# Patient Record
Sex: FEMALE | Race: BLACK OR AFRICAN AMERICAN | NOT HISPANIC OR LATINO | Employment: OTHER | URBAN - METROPOLITAN AREA
[De-identification: names, ages, dates, MRNs, and addresses within clinical notes are randomized per-mention and may not be internally consistent; named-entity substitution may affect disease eponyms.]

---

## 2024-03-11 ENCOUNTER — OFFICE VISIT (OUTPATIENT)
Dept: UROLOGY | Facility: CLINIC | Age: 69
End: 2024-03-11
Payer: MEDICARE

## 2024-03-11 VITALS
WEIGHT: 272.69 LBS | HEIGHT: 71 IN | BODY MASS INDEX: 38.18 KG/M2 | SYSTOLIC BLOOD PRESSURE: 99 MMHG | HEART RATE: 104 BPM | DIASTOLIC BLOOD PRESSURE: 68 MMHG

## 2024-03-11 DIAGNOSIS — Z85.53 PERSONAL HISTORY OF MALIGNANT NEOPLASM, RENAL PELVIS: Primary | ICD-10-CM

## 2024-03-11 DIAGNOSIS — R91.1 PULMONARY NODULE: ICD-10-CM

## 2024-03-11 DIAGNOSIS — C67.8 CANCER OF OVERLAPPING SITES OF BLADDER: ICD-10-CM

## 2024-03-11 DIAGNOSIS — N18.32 CHRONIC KIDNEY DISEASE (CKD) STAGE G3B/A1, MODERATELY DECREASED GLOMERULAR FILTRATION RATE (GFR) BETWEEN 30-44 ML/MIN/1.73 SQUARE METER AND ALBUMINURIA CREATININE RATIO LESS THAN 30 MG/G: ICD-10-CM

## 2024-03-11 DIAGNOSIS — Z85.54 HISTORY OF MALIGNANT NEOPLASM OF URETER: ICD-10-CM

## 2024-03-11 LAB
BILIRUB UR QL STRIP: NEGATIVE
GLUCOSE UR QL STRIP: NEGATIVE
KETONES UR QL STRIP: NEGATIVE
LEUKOCYTE ESTERASE UR QL STRIP: NEGATIVE
PH, POC UA: 5.5
POC BLOOD, URINE: NEGATIVE
POC NITRATES, URINE: NEGATIVE
POC RESIDUAL URINE VOLUME: 28 ML (ref 0–100)
PROT UR QL STRIP: NEGATIVE
SP GR UR STRIP: 1.02 (ref 1–1.03)
UROBILINOGEN UR STRIP-ACNC: 0.2 (ref 0.1–1.1)

## 2024-03-11 PROCEDURE — 99213 OFFICE O/P EST LOW 20 MIN: CPT | Mod: PBBFAC | Performed by: UROLOGY

## 2024-03-11 PROCEDURE — 99999 PR PBB SHADOW E&M-EST. PATIENT-LVL III: CPT | Mod: PBBFAC,,, | Performed by: UROLOGY

## 2024-03-11 PROCEDURE — 99999PBSHW POCT URINALYSIS, DIPSTICK, AUTOMATED, W/O SCOPE: Mod: PBBFAC,,,

## 2024-03-11 PROCEDURE — 99999PBSHW POCT BLADDER SCAN: Mod: PBBFAC,,,

## 2024-03-11 PROCEDURE — 81003 URINALYSIS AUTO W/O SCOPE: CPT | Mod: PBBFAC | Performed by: UROLOGY

## 2024-03-11 PROCEDURE — 51798 US URINE CAPACITY MEASURE: CPT | Mod: PBBFAC | Performed by: UROLOGY

## 2024-03-11 PROCEDURE — 99214 OFFICE O/P EST MOD 30 MIN: CPT | Mod: S$PBB,,, | Performed by: UROLOGY

## 2024-03-11 RX ORDER — COLCHICINE 0.6 MG/1
TABLET ORAL
COMMUNITY
Start: 2024-01-23 | End: 2024-05-30

## 2024-03-11 RX ORDER — BENZONATATE 200 MG/1
CAPSULE ORAL
COMMUNITY
Start: 2024-02-09 | End: 2024-05-30

## 2024-03-11 RX ORDER — DESLORATADINE 5 MG/1
5 TABLET, ORALLY DISINTEGRATING ORAL DAILY PRN
COMMUNITY

## 2024-03-11 RX ORDER — ALLOPURINOL 100 MG/1
100 TABLET ORAL
COMMUNITY
Start: 2024-01-23

## 2024-03-11 RX ORDER — CYCLOBENZAPRINE HCL 5 MG
5 TABLET ORAL NIGHTLY PRN
COMMUNITY
Start: 2024-02-14 | End: 2024-05-30

## 2024-03-11 RX ORDER — AMOXICILLIN AND CLAVULANATE POTASSIUM 875; 125 MG/1; MG/1
1 TABLET, FILM COATED ORAL 2 TIMES DAILY
COMMUNITY
Start: 2024-02-14 | End: 2024-05-30

## 2024-03-11 RX ORDER — SULFAMETHOXAZOLE AND TRIMETHOPRIM 800; 160 MG/1; MG/1
TABLET ORAL
COMMUNITY
Start: 2023-09-20 | End: 2024-05-30

## 2024-03-11 RX ORDER — TRIAMCINOLONE ACETONIDE 1 MG/G
OINTMENT TOPICAL
COMMUNITY
Start: 2023-12-03

## 2024-03-11 RX ORDER — LISINOPRIL 20 MG/1
20 TABLET ORAL DAILY
COMMUNITY

## 2024-03-11 RX ORDER — GABAPENTIN 300 MG/1
300 CAPSULE ORAL
COMMUNITY

## 2024-03-11 RX ORDER — DIAZEPAM 10 MG/1
10 TABLET ORAL DAILY PRN
COMMUNITY
Start: 2023-09-25 | End: 2024-05-30

## 2024-03-11 NOTE — PROGRESS NOTES
Subjective:       Patient ID: Sofya Padgett is a 68 y.o. female.    Chief Complaint: Establishment      History of Present Illness:     Ms Padgett has bladder cancer and underwent a normal surveillance cystoscopy on 11-29-23.  She is undergoing surveillance imaging through Dr Bedoya, her Oncologist.  Per Dr Bedoya, her CT scan from 1-10-24 looked good; it showed small lung nodules that decreased to 5 mm.  She says that she was treated for a UTI through her PCP a few months ago.  Denies any current symptoms of a UTI.  No gross hematuria.       Past Medical History:   Diagnosis Date    Acute cystitis without hematuria 2019    Bladder cancer 05/13/2022    Cancer of kidney     Chronic kidney disease, stage 3a 2023    Feeling of incomplete bladder emptying 2019    History of abnormal mammogram     Hypertension     Malignant neoplasm of left ureter 2022    Osteopenia 05/23/2022    Osteopenia of spine Normal hip    Other microscopic hematuria 2022    Personal history of malignant neoplasm of bladder 2022    Personal history of malignant neoplasm of renal pelvis 2022     Family History   Problem Relation Age of Onset    Diabetes Mother     Hypertension Father     Diabetes Maternal Grandmother     Stomach cancer Maternal Grandfather      Social History     Socioeconomic History    Marital status:    Tobacco Use    Smoking status: Never    Smokeless tobacco: Never   Substance and Sexual Activity    Alcohol use: Yes    Drug use: Never    Sexual activity: Yes     Outpatient Encounter Medications as of 3/11/2024   Medication Sig Dispense Refill    allopurinoL (ZYLOPRIM) 100 MG tablet Take 100 mg by mouth.      amoxicillin-clavulanate 875-125mg (AUGMENTIN) 875-125 mg per tablet Take 1 tablet by mouth 2 (two) times daily.      benzonatate (TESSALON) 200 MG capsule Take by mouth.      colchicine (COLCRYS) 0.6 mg tablet Take one tablet twice a day for first day.  Then one tablet, once a day x 7 days.      cyclobenzaprine  (FLEXERIL) 5 MG tablet Take 5 mg by mouth nightly as needed.      diazePAM (VALIUM) 10 MG Tab Take 10 mg by mouth daily as needed.      sulfamethoxazole-trimethoprim 800-160mg (BACTRIM DS) 800-160 mg Tab       triamcinolone acetonide 0.1% (KENALOG) 0.1 % ointment       gabapentin (NEURONTIN) 300 MG capsule Take 300 mg by mouth.      terconazole (TERAZOL 7) 0.4 % Crea Place 1 applicator vaginally every evening. 45 g 1    [DISCONTINUED] desloratadine (CLARINEX) 5 mg tablet       [DISCONTINUED] lisinopriL-hydrochlorothiazide (PRINZIDE,ZESTORETIC) 20-12.5 mg per tablet        No facility-administered encounter medications on file as of 3/11/2024.        Review of Systems   Constitutional:  Negative for chills and fever.   Respiratory:  Negative for shortness of breath.    Cardiovascular:  Negative for chest pain.   Gastrointestinal:  Negative for nausea and vomiting.   Genitourinary:  Negative for difficulty urinating and hematuria.   Musculoskeletal:  Negative for back pain.   Skin:  Negative for rash.   Neurological:  Negative for dizziness.   Psychiatric/Behavioral:  Negative for agitation.        Objective:     There were no vitals taken for this visit.    Physical Exam  Constitutional:       General: She is not in acute distress.  Pulmonary:      Effort: Pulmonary effort is normal.   Abdominal:      General: There is no distension.      Palpations: Abdomen is soft.   Neurological:      Mental Status: She is alert and oriented to person, place, and time.       Office Visit on 03/11/2024   Component Date Value Ref Range Status    POC Blood, Urine 03/11/2024 Negative  Negative Final    POC Bilirubin, Urine 03/11/2024 Negative  Negative Final    POC Urobilinogen, Urine 03/11/2024 0.2  0.1 - 1.1 Final    POC Ketones, Urine 03/11/2024 Negative  Negative Final    POC Protein, Urine 03/11/2024 Negative  Negative Final    POC Nitrates, Urine 03/11/2024 Negative  Negative Final    POC Glucose, Urine 03/11/2024 Negative   Negative Final    pH, UA 03/11/2024 5.5   Final    POC Specific Gravity, Urine 03/11/2024 1.020  1.003 - 1.029 Final    POC Leukocytes, Urine 03/11/2024 Negative  Negative Final        No results found for this or any previous visit (from the past 8760 hour(s)).       7-25-22  Cr 1.5    7-25-22  GFR 41.3      1-10-24  CT chest, abdomen, pelvis.  See report.  Small lung nodules that decreased to 5 mm.    10-24-22  CT chest, abdomen, pelvis without IV contrast.  Fabricio.  See report.  No acute abnormality of the chest, abdomen, or pelvis.  Nothing to suggest distant metastasis.  The right kidney is normal in appearance.  The bladder is normal in appearance.  No pulmonary nodules.      Assessment:       1. Personal history of malignant neoplasm, renal pelvis    2. Cancer of overlapping sites of bladder    3. History of malignant neoplasm of ureter    4. Chronic kidney disease (CKD) stage G3b/A1, moderately decreased glomerular filtration rate (GFR) between 30-44 mL/min/1.73 square meter and albuminuria creatinine ratio less than 30 mg/g    5. Pulmonary nodule      Plan:     Orders Placed This Encounter    POCT Bladder Scan    POCT Urinalysis, Dipstick, Automated, W/O Scope        Assessment:  - Bladder cancer.  - History of a TURBT of a small right posterior lateral tumor near the dome and of 2 small adjacent left posterior lateral tumors near the dome and a normal right retrograde pyelogram on 3-24-23.  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  - History of a TURBT of a 3.7 cm left trigone bladder tumor in the location of her left ureteral orifice and left diagnostic ureteroscopy with biopsies of her large left renal pelvis tumor on 5-13-22.  Path  of the left trigone bladder tumor:  Invasive high grade papillary urothelial carcinoma, invades superficial muscularis propria.  - Surveillance cystoscopy on 7-25-23 and 11-29-23 are normal.  - Induction BCG from 4-25-23 to 5-30-23.  - First maintenance BCG from 9-6-23 to 9-27-23.  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  - History of a left robotic nephroureterectomy on 6-6-22.  Path:  Invasive high grade papillary urothelial carcinoma, 2 foci, 4.5 cm left renal pelvis and 0.4 cm left distal ureter.  Renal pelvis tumor invades beyond muscularis into the renal parenchyma.  Distal ureteral tumor invades subepithelial connective tissue.  Margins uninvolved.  Lymph-vascular invasion is not identified.  pT3 (m).  - She was treated with chemotherapy due to her left renal pelvis and left distal ureteral urothelial carcinoma from August 2022 to 10- through her Oncologist, Dr Bedoya.  - CKD.  She says that Dr Hays is her Nephrologist.    Plan:  - The mutual decision was made to hold off on her 2nd maintenance BCG treatments until after her next surveillance cystoscopy.  - RTC in 3 weeks for a surveillance cystoscopy.  - I discussed dietary modifications with her today and I recommended she drink mostly water during the day.

## 2024-03-12 RX ORDER — DIAZEPAM 5 MG/1
5 TABLET ORAL ONCE AS NEEDED
Qty: 1 TABLET | Refills: 0 | Status: SHIPPED | OUTPATIENT
Start: 2024-03-12

## 2024-04-08 ENCOUNTER — PROCEDURE VISIT (OUTPATIENT)
Dept: UROLOGY | Facility: CLINIC | Age: 69
End: 2024-04-08
Payer: MEDICARE

## 2024-04-08 DIAGNOSIS — Z85.54 HISTORY OF MALIGNANT NEOPLASM OF URETER: Primary | ICD-10-CM

## 2024-04-08 DIAGNOSIS — C67.8 CANCER OF OVERLAPPING SITES OF BLADDER: ICD-10-CM

## 2024-04-08 LAB
BILIRUB UR QL STRIP: NEGATIVE
GLUCOSE UR QL STRIP: NEGATIVE
KETONES UR QL STRIP: NEGATIVE
LEUKOCYTE ESTERASE UR QL STRIP: NEGATIVE
PH, POC UA: 6.5
POC BLOOD, URINE: NEGATIVE
POC NITRATES, URINE: NEGATIVE
PROT UR QL STRIP: NEGATIVE
SP GR UR STRIP: 1.01 (ref 1–1.03)
UROBILINOGEN UR STRIP-ACNC: 1 (ref 0.1–1.1)

## 2024-04-08 PROCEDURE — 52000 CYSTOURETHROSCOPY: CPT | Mod: PBBFAC | Performed by: UROLOGY

## 2024-04-08 PROCEDURE — 81003 URINALYSIS AUTO W/O SCOPE: CPT | Mod: PBBFAC | Performed by: UROLOGY

## 2024-04-08 PROCEDURE — 99999PBSHW POCT URINALYSIS, DIPSTICK, AUTOMATED, W/O SCOPE: Mod: PBBFAC,,,

## 2024-04-08 PROCEDURE — 88112 CYTOPATH CELL ENHANCE TECH: CPT | Mod: 26,,, | Performed by: STUDENT IN AN ORGANIZED HEALTH CARE EDUCATION/TRAINING PROGRAM

## 2024-04-08 PROCEDURE — 88112 CYTOPATH CELL ENHANCE TECH: CPT | Performed by: STUDENT IN AN ORGANIZED HEALTH CARE EDUCATION/TRAINING PROGRAM

## 2024-04-08 NOTE — PROCEDURES
Cystoscopy    Date/Time: 4/8/2024 2:00 PM    Performed by: Nils Lin MD  Authorized by: Nils Lin MD    Consent Done?:  Yes (Written) and Yes (Verbal)  Timeout: prior to procedure the correct patient, procedure, and site was verified    Prep: patient was prepped and draped in usual sterile fashion    Local anesthesia used?: Yes    Anesthesia:  Lidocaine jelly  Local anesthetic:  Topical anesthetic  Indications: history bladder cancer    Position:  Dorsal lithotomy  Anesthesia:  Lidocaine jelly  Patient sedated?: No    Preparation: Patient was prepped and draped in usual sterile fashion    Scope type:  Flexible cystoscope  Urethra normal: Yes    Bladder neck normal: Yes    Bladder normal: Yes    Comments:      Her right ureteral orifice is orthotopic and patent with clear efflux.  Her left ureteral orifice is surgically absent.  No bladder tumors, lesions, stones, or foreign bodies are seen.  A bladder wash urine cytology sample was obtained.  The patient tolerated this procedure well.        7-25-22  Cr 1.5     7-25-22  GFR 41.3        1-10-24  CT chest, abdomen, pelvis.  See report.  Small lung nodules that decreased to 5 mm.     10-24-22  CT chest, abdomen, pelvis without IV contrast.  Fabricio.  See report.  No acute abnormality of the chest, abdomen, or pelvis.  Nothing to suggest distant metastasis.  The right kidney is normal in appearance.  The bladder is normal in appearance.  No pulmonary nodules.          Assessment:  - Bladder cancer.  - Surveillance cystoscopy today on 4-8-24 is normal.  - History of a TURBT of a small right posterior lateral tumor near the dome and of 2 small adjacent left posterior lateral tumors near the dome and a normal right retrograde pyelogram on 3-24-23.  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  - History of a TURBT of a 3.7 cm left trigone bladder tumor in the location of her left ureteral orifice and left diagnostic ureteroscopy with biopsies of her large left renal pelvis tumor on 5-13-22.  Path of the left trigone bladder tumor:  Invasive high grade papillary urothelial carcinoma, invades superficial muscularis propria.  - Surveillance cystoscopy on 7-25-23 and 11-29-23 are normal.  - Induction BCG from 4-25-23 to 5-30-23.  - First maintenance BCG from 9-6-23 to 9-27-23.  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  - History of a left robotic nephroureterectomy on 6-6-22.  Path:  Invasive high grade papillary urothelial carcinoma, 2 foci, 4.5 cm left renal pelvis and 0.4 cm left distal ureter.  Renal pelvis tumor invades beyond muscularis into the renal parenchyma.  Distal ureteral tumor invades subepithelial connective tissue.  Margins uninvolved.  Lymph-vascular invasion is not identified.  pT3 (m).  - She was treated with chemotherapy due to her left renal pelvis and left distal ureteral urothelial carcinoma from August 2022 to 10- through her Oncologist, Dr Bedoya.  - CKD.  She says that Dr Hays is her Nephrologist.     Plan:  - Bladder wash urine cytology with reflex FISH today.  - I discussed options with the patient and she would like to proceed with her   2nd maintenance BCG treatments (1 BCG treatment per week for 3 weeks) starting 3 weeks from now.  I sent a message to my nurse to set this up.  - I discussed dietary modifications with her today and I recommended she drink mostly water during the day.   - She says that she is undergoing imaging with her Oncologist, Dr Bedoya, in May 2024.  - RTC in 4 months for a surveillance cystoscopy.

## 2024-04-09 LAB
FINAL PATHOLOGIC DIAGNOSIS: NORMAL
Lab: NORMAL

## 2024-04-10 DIAGNOSIS — C67.8 MALIGNANT NEOPLASM OF OVERLAPPING SITES OF BLADDER: Primary | ICD-10-CM

## 2024-04-11 ENCOUNTER — TELEPHONE (OUTPATIENT)
Dept: UROLOGY | Facility: CLINIC | Age: 69
End: 2024-04-11
Payer: MEDICARE

## 2024-04-11 NOTE — TELEPHONE ENCOUNTER
Patient was notified, patient reports that she will decide on days and get back with me.     ----- Message from Nils Lin MD sent at 4/10/2024  8:02 AM CDT -----  Regarding: RE: Referral for authrization on BCG treatments  I placed the authorization for her BCG treatments.  ----- Message -----  From: Toya Aponte MA  Sent: 4/9/2024   1:07 PM CDT  To: Nils Lin MD  Subject: Referral for authrization on BCG treatments      I was informed that you would need to put in a referral for authorization for the BCG treatments.   ----- Message -----  From: Nils Lin MD  Sent: 4/8/2024   2:34 PM CDT  To: [facility] Staff    Please set Ms Padgett up for her 2nd maintenance BCG treatments (1 BCG treatment per week for 3 weeks) starting 3 weeks from now.   Please schedule her a surveillance cystoscopy with me in 4 months.  Please make sure Ms Padgett is aware of everything.

## 2024-04-11 NOTE — TELEPHONE ENCOUNTER
Patient was notified. Patient is thinking about dates to come in for BCG treatment.     ----- Message from Nils Lin MD sent at 4/10/2024  1:21 PM CDT -----  Please call Ms Padgett and let her know that I reviewed her urine cytology result and it was negative for high grade cancer cells.  Please make sure she gets set up for her maintenance BCG treatments (1 treatment per week for 3 weeks) to be done at [facility]).  Ask her if she has any questions.

## 2024-04-12 ENCOUNTER — TELEPHONE (OUTPATIENT)
Dept: UROLOGY | Facility: CLINIC | Age: 69
End: 2024-04-12
Payer: MEDICARE

## 2024-04-17 DIAGNOSIS — C67.8 MALIGNANT NEOPLASM OF OVERLAPPING SITES OF BLADDER: Primary | ICD-10-CM

## 2024-04-17 NOTE — PROGRESS NOTES
I placed her prior authorization BCG orders for Mr Dayron to get maintenance BCG treatments (1 treatment per week for 3 weeks).

## 2024-04-18 ENCOUNTER — TELEPHONE (OUTPATIENT)
Dept: UROLOGY | Facility: CLINIC | Age: 69
End: 2024-04-18
Payer: MEDICARE

## 2024-04-18 NOTE — TELEPHONE ENCOUNTER
I notified patient. Scheduled BCG treatments.     ----- Message from Nils Lin MD sent at 4/17/2024 10:57 AM CDT -----  Regarding: RE: BCG  I have now placed 3 separate prior authorization orders for her 3 BCG maintenance treatments.  Make sure all 3 BCG treatments get scheduled and make sure the patient knows the details of her 3 treatments.  ----- Message -----  From: Toya Aponte MA  Sent: 4/16/2024   3:12 PM CDT  To: Nils Lin MD  Subject: BCG                                              Patient already aware of results, needs another referral for additional BCG treatments. Patient is already scheduled for one.  ----- Message -----  From: Nils Lin MD  Sent: 4/16/2024   7:03 AM CDT  To: Toya Aponte MA    I received this notification today that Ms Padgett has not viewed the portal message that I sent her on 4-10-24:    Please call Ms Padgett and let her know that I reviewed her urine cytology result and it was negative for high grade cancer cells.  Please make sure she gets set up for her maintenance BCG treatments (1 treatment per week for 3 weeks) to be done at [facility]).  Ask her if she has any questions.

## 2024-05-08 ENCOUNTER — TELEPHONE (OUTPATIENT)
Dept: UROLOGY | Facility: CLINIC | Age: 69
End: 2024-05-08
Payer: MEDICARE

## 2024-05-08 NOTE — TELEPHONE ENCOUNTER
Patient wants to get three valium tablets prescribed prior to her BCG appointments, please advise!

## 2024-05-14 ENCOUNTER — TELEPHONE (OUTPATIENT)
Dept: UROLOGY | Facility: CLINIC | Age: 69
End: 2024-05-14
Payer: MEDICARE

## 2024-05-14 DIAGNOSIS — F41.9 ANXIETY: Primary | ICD-10-CM

## 2024-05-14 RX ORDER — DIAZEPAM 10 MG/1
10 TABLET ORAL ONCE AS NEEDED
Qty: 3 TABLET | Refills: 0 | Status: SHIPPED | OUTPATIENT
Start: 2024-05-14

## 2024-05-15 ENCOUNTER — OFFICE VISIT (OUTPATIENT)
Dept: UROLOGY | Facility: CLINIC | Age: 69
End: 2024-05-15
Payer: MEDICARE

## 2024-05-15 ENCOUNTER — TELEPHONE (OUTPATIENT)
Dept: UROLOGY | Facility: CLINIC | Age: 69
End: 2024-05-15

## 2024-05-15 VITALS
BODY MASS INDEX: 38.03 KG/M2 | WEIGHT: 271.63 LBS | SYSTOLIC BLOOD PRESSURE: 110 MMHG | HEIGHT: 71 IN | HEART RATE: 91 BPM | DIASTOLIC BLOOD PRESSURE: 71 MMHG

## 2024-05-15 DIAGNOSIS — Z85.53 PERSONAL HISTORY OF MALIGNANT NEOPLASM, RENAL PELVIS: Primary | ICD-10-CM

## 2024-05-15 LAB
BILIRUB UR QL STRIP: NEGATIVE
GLUCOSE UR QL STRIP: NEGATIVE
KETONES UR QL STRIP: NEGATIVE
LEUKOCYTE ESTERASE UR QL STRIP: NEGATIVE
PH, POC UA: 6
POC BLOOD, URINE: NEGATIVE
POC NITRATES, URINE: NEGATIVE
PROT UR QL STRIP: NEGATIVE
SP GR UR STRIP: 1.02 (ref 1–1.03)
UROBILINOGEN UR STRIP-ACNC: 0.2 (ref 0.1–1.1)

## 2024-05-15 PROCEDURE — 99213 OFFICE O/P EST LOW 20 MIN: CPT | Mod: PBBFAC | Performed by: NURSE PRACTITIONER

## 2024-05-15 PROCEDURE — 99999 PR PBB SHADOW E&M-EST. PATIENT-LVL III: CPT | Mod: PBBFAC,,, | Performed by: NURSE PRACTITIONER

## 2024-05-15 PROCEDURE — 81003 URINALYSIS AUTO W/O SCOPE: CPT | Mod: PBBFAC | Performed by: NURSE PRACTITIONER

## 2024-05-15 PROCEDURE — 99214 OFFICE O/P EST MOD 30 MIN: CPT | Mod: S$PBB,,, | Performed by: NURSE PRACTITIONER

## 2024-05-15 PROCEDURE — 99999PBSHW PR PBB SHADOW TECHNICAL ONLY FILED TO HB: Mod: PBBFAC,,,

## 2024-05-15 PROCEDURE — 99999PBSHW POCT URINALYSIS, DIPSTICK, AUTOMATED, W/O SCOPE: Mod: PBBFAC,,,

## 2024-05-15 RX ADMIN — BACILLUS CALMETTE-GUERIN 50 MG: 50 POWDER, FOR SUSPENSION INTRAVESICAL at 10:05

## 2024-05-15 NOTE — PATIENT INSTRUCTIONS
..  BCG Patient Instructions    Before instillation  Do not drink any liquids for 4 hours before you are scheduled to receive BCG therapy  Please be prompt for your appointment because BCG may be mixed in advance to be ready for you  Inform the nurse if you have had a fever, chills or been unusually tired since your last treatment. Also, let us know if you have been urinating bright red blood or blood clots since your last treatment.   Urinate to empty your bladder just before the BCG treatment    During instillation  The medication will be installed into your bladder through a plastic tube called a catheter.   The catheter will be removed from the bladder immediately after the instillation is completed.  The medication should remain in your bladder for up to 2 hours for best results.    After instillation  Remain active while the mediation is in the bladder, this helps move the medicine around in the bladder.  At or around 2 hours for your initial urination, sit to void to avoid splashing and fully empty your bladder.   After urinating, pour 2 cups of household bleach into the toilet and close the lid. Let stand for 15 minutes.  Wash your hands and genital areas with soap and water after you urinate.  Repeat the above process each time you urinate over the next 6 hours.  Drink plenty of fluids to help dilute the urine. Some burning with urination is normal.    Symptoms to watch for  Chills, severe shivering, and/or fever over 101.3 degrees. Call the office if you experience any of these.      If you need to cancel the procedure, become ill, or have a question, please contact the office as soon as possible at [phone].

## 2024-05-15 NOTE — PROGRESS NOTES
......Patient in today for BCG treatment.  #1/3.  One vial of BCG and 50ml perservative free sodium chloride 0.9% mixed as per instructions.  Using aseptic technique, a sterile fenestrated drape was placed over perineum.  Pt was catheterized using a #14Fr red rubber catheter to allow bladder emptying.  Using closed system, one vial of BCG instilled via gravity directly into bladder.  Pt tolerated well.  Instructed pt to keep BCG solution in bladder for 2 hours.  Pt was given instructions to follow for the next 6 hours, including sitting on toilet at home and using 2 cups of undiluted chlorine bleach and closing the lid.  Pt was told to allow bleach to stand for 15 minutes before flushing toilet.  Patient was also told to drink plenty of water to flush any remaining BCG bacteria.  Patient verbalized understanding.

## 2024-05-15 NOTE — TELEPHONE ENCOUNTER
Called pt and scheduled her an appt to meet with Dr. Lin to discuss alternate treatments for BCG due to the shortage of the medication.

## 2024-05-15 NOTE — PROGRESS NOTES
Chief Complaint:   Bladder cancer    HPI:   Patient is a 68-year-old female that is presenting for BCG treatment.  Urine in clinic is negative.  Patient denies gross hematuria.  04/08/2024  Dr. BIA Lin  Bladder cancer.  - Surveillance cystoscopy today on 4-8-24 is normal.  - History of a TURBT of a small right posterior lateral tumor near the dome and of 2 small adjacent left posterior lateral tumors near the dome and a normal right retrograde pyelogram on 3-24-23.  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  - History of a TURBT of a 3.7 cm left trigone bladder tumor in the location of her left ureteral orifice and left diagnostic ureteroscopy with biopsies of her large left renal pelvis tumor on 5-13-22.  Path of the left trigone bladder tumor:  Invasive high grade papillary urothelial carcinoma, invades superficial muscularis propria.  - Surveillance cystoscopy on 7-25-23 and 11-29-23 are normal.  - Induction BCG from 4-25-23 to 5-30-23.  - First maintenance BCG from 9-6-23 to 9-27-23.  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  - History of a left robotic nephroureterectomy on 6-6-22.  Path:  Invasive high grade papillary urothelial carcinoma, 2 foci, 4.5 cm left renal pelvis and 0.4 cm left distal ureter.  Renal pelvis tumor invades beyond muscularis into the renal parenchyma.  Distal ureteral tumor invades subepithelial connective tissue.  Margins uninvolved.  Lymph-vascular invasion is not identified.  pT3 (m).  - She was treated with chemotherapy due to her left renal pelvis and left distal ureteral urothelial carcinoma from August 2022 to 10- through her Oncologist, Dr Bedoya.  - CKD.  She  says that Dr Hays is her Nephrologist.  Allergies:  Propofol    Medications:  has a current medication list which includes the following prescription(s): allopurinol, amoxicillin-clavulanate 875-125mg, benzonatate, colchicine, cyclobenzaprine, desloratadine, diazepam, diazepam, diazepam, gabapentin, lisinopril, sulfamethoxazole-trimethoprim 800-160mg, terconazole, and triamcinolone acetonide 0.1%.    Review of Systems:  General: No fever, chills, fatigability, or weight loss.  Skin: No rashes, itching, or changes in color or texture of skin.  Chest: Denies POWER, cyanosis, wheezing, cough, and sputum production.  Abdomen: Appetite fine. No weight loss. Denies diarrhea, abdominal pain, hematemesis, or blood in stool.  Musculoskeletal: No joint stiffness or swelling. Denies back pain.  : As above.  All other review of systems negative.    PMH:   has a past medical history of Acute cystitis without hematuria (2019), Bladder cancer (05/13/2022), Cancer of kidney, Chronic kidney disease, stage 3a (2023), Feeling of incomplete bladder emptying (2019), History of abnormal mammogram, Hypertension, Malignant neoplasm of left ureter (2022), Osteopenia (05/23/2022), Other microscopic hematuria (2022), Personal history of malignant neoplasm of bladder (2022), and Personal history of malignant neoplasm of renal pelvis (2022).    PSH:   has a past surgical history that includes Ankle surgery (Left); Appendectomy; Dilation and curettage of uterus; Hemorrhoid surgery; Total abdominal hysterectomy w/ bilateral salpingoophorectomy (05/30/1997); Tubal ligation; Bladder tumor excision (05/13/2022); Nephrectomy (Left, 06/03/2022); and Vaginal delivery.    FamHx: family history includes Diabetes in her maternal grandmother and mother; Hypertension in her father; Stomach cancer in her maternal grandfather.    SocHx:  reports that she has quit smoking. Her smoking use included cigarettes. She has never used smokeless tobacco. She reports  current alcohol use. She reports that she does not use drugs.      Physical Exam:  Vitals:    05/15/24 1000   BP: 110/71   Pulse: 91     General: A&Ox3, no apparent distress, no deformities  Neck: No masses, normal thyroid  Lungs: normal inspiration, no use of accessory muscles  Heart: normal pulse, no arrhythmias  Abdomen: Soft, NT, ND, no masses, no hernias, no hepatosplenomegaly  Lymphatic: Neck and groin nodes negative  Labs/Studies:   Urine in clinic was negative    Impression/Plan:   History of bladder cancer  BCG per protocol, see nursing note.

## 2024-05-17 ENCOUNTER — OFFICE VISIT (OUTPATIENT)
Dept: UROLOGY | Facility: CLINIC | Age: 69
End: 2024-05-17
Payer: MEDICARE

## 2024-05-17 VITALS
WEIGHT: 271.63 LBS | HEART RATE: 94 BPM | DIASTOLIC BLOOD PRESSURE: 84 MMHG | RESPIRATION RATE: 16 BRPM | BODY MASS INDEX: 37.88 KG/M2 | SYSTOLIC BLOOD PRESSURE: 141 MMHG

## 2024-05-17 DIAGNOSIS — Z85.54 HISTORY OF MALIGNANT NEOPLASM OF URETER: ICD-10-CM

## 2024-05-17 DIAGNOSIS — C67.8 MALIGNANT NEOPLASM OF OVERLAPPING SITES OF BLADDER: Primary | ICD-10-CM

## 2024-05-17 DIAGNOSIS — Z85.53 PERSONAL HISTORY OF MALIGNANT NEOPLASM, RENAL PELVIS: ICD-10-CM

## 2024-05-17 PROCEDURE — 99214 OFFICE O/P EST MOD 30 MIN: CPT | Mod: S$PBB,,, | Performed by: UROLOGY

## 2024-05-17 PROCEDURE — 99999 PR PBB SHADOW E&M-EST. PATIENT-LVL II: CPT | Mod: PBBFAC,,, | Performed by: UROLOGY

## 2024-05-17 PROCEDURE — 99212 OFFICE O/P EST SF 10 MIN: CPT | Mod: PBBFAC | Performed by: UROLOGY

## 2024-05-17 NOTE — PROGRESS NOTES
Subjective:       Patient ID: Sofya Padgett is a 68 y.o. female.    Chief Complaint: Follow-up ([facility] BCG options )      History of Present Illness:     Ms Padgett underwent a TURBT of a small right posterior lateral tumor near the dome and of 2 small adjacent left posterior lateral tumors near the dome and a normal right retrograde pyelogram on 3-24-23.  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    She has a history of a TURBT of a small right posterior lateral tumor near the dome and of 2 small adjacent left posterior lateral tumors near the dome and a normal right retrograde pyelogram on 3-24-23.  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    She has a history of a TURBT of a 3.7 cm left trigone bladder tumor in the location of her left ureteral orifice and left diagnostic ureteroscopy with biopsies of her large left renal pelvis tumor on 5-13-22.  Path of the left trigone bladder tumor:  Invasive high grade papillary urothelial carcinoma, invades superficial muscularis propria.    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 She is s/p left robotic nephroureterectomy by me on 6-6-22.  Path:  Invasive high grade papillary urothelial carcinoma, 2 foci, 4.5 cm left renal pelvis and 0.4 cm left distal ureter.  Renal pelvis tumor invades beyond muscularis into the renal parenchyma.  Distal ureteral tumor invades subepithelial  connective tissue.  Margins uninvolved.  Lymph-vascular invasion is not identified.  pT3 (m).    She was treated with chemotherapy due to her left renal pelvis and left distal ureteral urothelial carcinoma from August 2022 to 10- through her Oncologist, Dr Bedoya.    Her surveillance cystoscopies on 7-25-23, 11-29-23, and on 4-8-24 are normal.    She underwent induction BCG from 4-25-23 to 5-30-23.  Her first maintenance BCG from 9-6-23 to 9-27-23.  She received 1 dose of her 2nd round of maintenance BCG on 5-15-24.  She has tolerated BCG well.  She is here today for discussion of her maintenance BCG treatments.  She has no urinary complaints at this time.  No gross hematuria.           Past Medical History:   Diagnosis Date    Acute cystitis without hematuria 2019    Bladder cancer 05/13/2022    Cancer of kidney     Chronic kidney disease, stage 3a 2023    Feeling of incomplete bladder emptying 2019    History of abnormal mammogram     Hypertension     Malignant neoplasm of left ureter 2022    Osteopenia 05/23/2022    Osteopenia of spine Normal hip    Other microscopic hematuria 2022    Personal history of malignant neoplasm of bladder 2022    Personal history of malignant neoplasm of renal pelvis 2022     Family History   Problem Relation Name Age of Onset    Diabetes Mother      Hypertension Father      Diabetes Maternal Grandmother      Stomach cancer Maternal Grandfather       Social History     Socioeconomic History    Marital status:    Tobacco Use    Smoking status: Former     Types: Cigarettes    Smokeless tobacco: Never    Tobacco comments:     Quit in 80's   Substance and Sexual Activity    Alcohol use: Yes    Drug use: Never    Sexual activity: Yes     Outpatient Encounter Medications as of 5/17/2024   Medication Sig Dispense Refill    allopurinoL (ZYLOPRIM) 100 MG tablet Take 100 mg by mouth.      desloratadine (CLARINEX REDITAB) 5 mg disintegrating tablet Take 5 mg by mouth daily as  needed.      diazePAM (VALIUM) 10 MG Tab Take 1 tablet (10 mg total) by mouth 1 (one) time if needed (Take 1 by mouth as needed for anxiety 30 minutes prior to the procedure). 3 tablet 0    diazePAM (VALIUM) 5 MG tablet Take 1 tablet (5 mg total) by mouth 1 (one) time if needed for Anxiety (Take 1 by mouth as needed for anxiety 30 minutes prior to the procedure). 1 tablet 0    gabapentin (NEURONTIN) 300 MG capsule Take 300 mg by mouth.      lisinopriL (PRINIVIL,ZESTRIL) 20 MG tablet Take 20 mg by mouth once daily.      amoxicillin-clavulanate 875-125mg (AUGMENTIN) 875-125 mg per tablet Take 1 tablet by mouth 2 (two) times daily.      benzonatate (TESSALON) 200 MG capsule Take by mouth.      colchicine (COLCRYS) 0.6 mg tablet Take one tablet twice a day for first day.  Then one tablet, once a day x 7 days.      cyclobenzaprine (FLEXERIL) 5 MG tablet Take 5 mg by mouth nightly as needed.      diazePAM (VALIUM) 10 MG Tab Take 10 mg by mouth daily as needed.      sulfamethoxazole-trimethoprim 800-160mg (BACTRIM DS) 800-160 mg Tab       terconazole (TERAZOL 7) 0.4 % Crea Place 1 applicator vaginally every evening. 45 g 1    triamcinolone acetonide 0.1% (KENALOG) 0.1 % ointment        No facility-administered encounter medications on file as of 5/17/2024.        Review of Systems   Constitutional:  Negative for chills and fever.   Respiratory:  Negative for shortness of breath.    Cardiovascular:  Negative for chest pain.   Gastrointestinal:  Negative for nausea and vomiting.   Genitourinary:  Negative for difficulty urinating, dysuria, frequency, hematuria and urgency.   Musculoskeletal:  Negative for back pain.   Skin:  Negative for rash.   Neurological:  Negative for dizziness.   Psychiatric/Behavioral:  Negative for agitation.        Objective:     BP (!) 141/84   Pulse 94   Resp 16   Wt 123.2 kg (271 lb 9.7 oz)   BMI 37.88 kg/m²     Physical Exam  Constitutional:       General: She is not in acute  distress.  Pulmonary:      Effort: Pulmonary effort is normal.   Abdominal:      General: There is no distension.      Palpations: Abdomen is soft.   Neurological:      Mental Status: She is alert and oriented to person, place, and time.         No visits with results within 1 Day(s) from this visit.   Latest known visit with results is:   Office Visit on 05/15/2024   Component Date Value Ref Range Status    POC Blood, Urine 05/15/2024 Negative  Negative Final    POC Bilirubin, Urine 05/15/2024 Negative  Negative Final    POC Urobilinogen, Urine 05/15/2024 0.2  0.1 - 1.1 Final    POC Ketones, Urine 05/15/2024 Negative  Negative Final    POC Protein, Urine 05/15/2024 Negative  Negative Final    POC Nitrates, Urine 05/15/2024 Negative  Negative Final    POC Glucose, Urine 05/15/2024 Negative  Negative Final    pH, UA 05/15/2024 6.0   Final    POC Specific Gravity, Urine 05/15/2024 1.025  1.003 - 1.029 Final    POC Leukocytes, Urine 05/15/2024 Negative  Negative Final        Results for orders placed or performed in visit on 03/11/24 (from the past 8760 hour(s))   POCT Bladder Scan   Result Value    POC Residual Urine Volume 28        Assessment:       1. Malignant neoplasm of overlapping sites of bladder    2. History of malignant neoplasm of ureter    3. Personal history of malignant neoplasm, renal pelvis      Plan:                   1-10-24  CT chest, abdomen, pelvis.  See report.  Small lung nodules that decreased to 5 mm.     10-24-22  CT chest, abdomen, pelvis without IV contrast.  Fabricio.  See report.  No acute abnormality of the chest, abdomen, or pelvis.  Nothing to suggest distant metastasis.  The right kidney is normal in appearance.  The bladder is normal in appearance.  No pulmonary nodules.    I reviewed all of the above imaging results.             4-17-24  Cr 1.48.  GFR 38.  BUN 27.0.     7-25-22  Cr 1.5.  GFR 41.3.    4-8-24  Urine cytology.  Negative.    I reviewed all of the above lab results.           Assessment:  - Bladder cancer.  - Surveillance cystoscopies on 7-25-23, 11-29-23, and on 4-8-24 are normal.  - History of a TURBT of a small right posterior lateral tumor near the dome and of 2 small adjacent left posterior lateral tumors near the dome and a normal right retrograde pyelogram on 3-24-23.  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  - History of a TURBT of a 3.7 cm left trigone bladder tumor in the location of her left ureteral orifice and left diagnostic ureteroscopy with biopsies of her large left renal pelvis tumor on 5-13-22.  Path of the left trigone bladder tumor:  Invasive high grade papillary urothelial carcinoma, invades superficial muscularis propria.  - Induction BCG from 4-25-23 to 5-30-23.  - First maintenance BCG from 9-6-23 to 9-27-23.  She received 1 dose of her 2nd round of maintenance BCG on 5-15-24.  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  - History of a left robotic nephroureterectomy by me on 6-6-22.  Path:  Invasive high grade papillary urothelial carcinoma, 2 foci, 4.5 cm left renal pelvis and 0.4 cm left distal ureter.  Renal pelvis tumor invades beyond muscularis into the renal parenchyma.  Distal ureteral tumor invades subepithelial connective tissue.  Margins uninvolved.  Lymph-vascular invasion is not identified.  pT3 (m).  - She was treated with chemotherapy due to her left renal pelvis and left distal ureteral urothelial carcinoma from August 2022 to 10- through her Oncologist, Dr Bedoya.  - CKD.  She says that Dr Hays is her Nephrologist.     Plan:  - I explained to the patient today that with the shortage of BCG, that I was told that [facility] is  trying to reserve BCG treatments for patients getting induction BCG which she has had and not maintenance BCG.  The patient was very adamant during her office visit today and she wants to continue with maintenance BCG treatments and that she she cannot get it through [facility] that she will go elsewhere to get the BCG.  She also said that she does not want to go to [place] to have to get her BCG and that she wants to get it locally in [place].  I did discuss her case with my  and Ms Padgett was approved to get her 2nd and 3rd dose of maintenance BCG this time.  I explained to the patient that she may have to go elsewhere if she wants to continue with maintenance BCG beyond her 2nd round and she expressed understanding.  I answered all of the patients questions today to her apparent understanding and to her apparent satisfaction.  - Continue with her 2nd round of maintenance BCG treatments which are scheduled for 5-22-24 and 5-29-24.  She received 1 dose of her 2nd round of maintenance BCG on 5-15-24.  - I discussed dietary modifications with her today and I recommended she drink mostly water during the day.   - She says that she is undergoing her next surveillance imaging with her Oncologist, Dr Bedoya, on 7-17-24 at [place].  - Her next surveillance cystoscopy is scheduled for 8-.

## 2024-05-22 ENCOUNTER — OFFICE VISIT (OUTPATIENT)
Dept: UROLOGY | Facility: CLINIC | Age: 69
End: 2024-05-22
Payer: MEDICARE

## 2024-05-22 VITALS — HEIGHT: 71 IN | BODY MASS INDEX: 38.03 KG/M2 | WEIGHT: 271.63 LBS | RESPIRATION RATE: 14 BRPM

## 2024-05-22 DIAGNOSIS — C67.0 MALIGNANT NEOPLASM OF TRIGONE OF URINARY BLADDER: Primary | ICD-10-CM

## 2024-05-22 PROCEDURE — 81003 URINALYSIS AUTO W/O SCOPE: CPT | Mod: PBBFAC | Performed by: NURSE PRACTITIONER

## 2024-05-22 PROCEDURE — 99214 OFFICE O/P EST MOD 30 MIN: CPT | Mod: S$PBB,,, | Performed by: NURSE PRACTITIONER

## 2024-05-22 PROCEDURE — 99999 PR PBB SHADOW E&M-EST. PATIENT-LVL III: CPT | Mod: PBBFAC,,, | Performed by: NURSE PRACTITIONER

## 2024-05-22 PROCEDURE — 99999PBSHW PR PBB SHADOW TECHNICAL ONLY FILED TO HB: Mod: PBBFAC,,,

## 2024-05-22 PROCEDURE — 99999PBSHW POCT URINALYSIS, DIPSTICK, AUTOMATED, W/O SCOPE: Mod: PBBFAC,,,

## 2024-05-22 PROCEDURE — 99213 OFFICE O/P EST LOW 20 MIN: CPT | Mod: PBBFAC | Performed by: NURSE PRACTITIONER

## 2024-05-22 RX ADMIN — BACILLUS CALMETTE-GUERIN 50 MG: 50 POWDER, FOR SUSPENSION INTRAVESICAL at 11:05

## 2024-05-22 NOTE — PROGRESS NOTES
Chief Complaint:   Bladder cancer     HPI:   Patient is a 68-year-old female that is presenting for BCG treatment.  Urine in clinic is negative.  Patient denies gross hematuria.  04/08/2024  Dr. BIA Lin  Bladder cancer.  - Surveillance cystoscopy today on 4-8-24 is normal.  - History of a TURBT of a small right posterior lateral tumor near the dome and of 2 small adjacent left posterior lateral tumors near the dome and a normal right retrograde pyelogram on 3-24-23.  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  - History of a TURBT of a 3.7 cm left trigone bladder tumor in the location of her left ureteral orifice and left diagnostic ureteroscopy with biopsies of her large left renal pelvis tumor on 5-13-22.  Path of the left trigone bladder tumor:  Invasive high grade papillary urothelial carcinoma, invades superficial muscularis propria.  - Surveillance cystoscopy on 7-25-23 and 11-29-23 are normal.  - Induction BCG from 4-25-23 to 5-30-23.  - First maintenance BCG from 9-6-23 to 9-27-23.  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  - History of a left robotic nephroureterectomy on 6-6-22.  Path:  Invasive high grade papillary urothelial carcinoma, 2 foci, 4.5 cm left renal pelvis and 0.4 cm left distal ureter.  Renal pelvis tumor invades beyond muscularis into the renal parenchyma.  Distal ureteral tumor invades subepithelial connective tissue.  Margins uninvolved.  Lymph-vascular invasion is not identified.  pT3 (m).  - She was treated with chemotherapy due to her left renal pelvis and left distal ureteral urothelial carcinoma from August 2022 to 10- through her Oncologist, Dr Bdeoya.  - CKD.  She  says that Dr Hays is her Nephrologist.  Allergies:  Propofol     Medications:  has a current medication list which includes the following prescription(s): allopurinol, amoxicillin-clavulanate 875-125mg, benzonatate, colchicine, cyclobenzaprine, desloratadine, diazepam, diazepam, diazepam, gabapentin, lisinopril, sulfamethoxazole-trimethoprim 800-160mg, terconazole, and triamcinolone acetonide 0.1%.     Review of Systems:  General: No fever, chills, fatigability, or weight loss.  Skin: No rashes, itching, or changes in color or texture of skin.  Chest: Denies POWER, cyanosis, wheezing, cough, and sputum production.  Abdomen: Appetite fine. No weight loss. Denies diarrhea, abdominal pain, hematemesis, or blood in stool.  Musculoskeletal: No joint stiffness or swelling. Denies back pain.  : As above.  All other review of systems negative.     PMH:   has a past medical history of Acute cystitis without hematuria (2019), Bladder cancer (05/13/2022), Cancer of kidney, Chronic kidney disease, stage 3a (2023), Feeling of incomplete bladder emptying (2019), History of abnormal mammogram, Hypertension, Malignant neoplasm of left ureter (2022), Osteopenia (05/23/2022), Other microscopic hematuria (2022), Personal history of malignant neoplasm of bladder (2022), and Personal history of malignant neoplasm of renal pelvis (2022).     PSH:   has a past surgical history that includes Ankle surgery (Left); Appendectomy; Dilation and curettage of uterus; Hemorrhoid surgery; Total abdominal hysterectomy w/ bilateral salpingoophorectomy (05/30/1997); Tubal ligation; Bladder tumor excision (05/13/2022); Nephrectomy (Left, 06/03/2022); and Vaginal delivery.     FamHx: family history includes Diabetes in her maternal grandmother and mother; Hypertension in her father; Stomach cancer in her maternal grandfather.     SocHx:  reports that she has quit smoking. Her smoking use included cigarettes. She has never used smokeless tobacco. She  reports current alcohol use. She reports that she does not use drugs.       Physical Exam:      Vitals:     05/22/2024   BP: 112/82   Pulse: 78      General: A&Ox3, no apparent distress, no deformities  Neck: No masses, normal thyroid  Lungs: normal inspiration, no use of accessory muscles  Heart: normal pulse, no arrhythmias  Abdomen: Soft, NT, ND, no masses, no hernias, no hepatosplenomegaly  Lymphatic: Neck and groin nodes negative  Labs/Studies:   Urine in clinic was negative     Impression/Plan:   History of bladder cancer  BCG per protocol, see nursing note.

## 2024-05-22 NOTE — PROGRESS NOTES
....Patient in today for BCG treatment.  #2/3. One vial of BCG and 50ml perservative free sodium chloride 0.9% mixed as per instructions.  Using aseptic technique, a sterile fenestrated drape was placed over penis.  Pt was catheterized using a #14Fr red rubber catheter to allow bladder emptying.  Private area cleansed with betadine.  Using closed system, one vial of BCG instilled via gravity directly into bladder.  Pt tolerated well.  Instructed pt to keep BCG solution in bladder for 2 hours.  Pt was given instructions to follow for the next 6 hours, including sitting on her toilet at home and using 2 cups of undiluted chlorine bleach and closing the lid.  Pt was told to allow bleach to stand for 15 minutes before flushing toilet.  She was also told to drink plenty of water to flush any remaining BCG bacteria.  Patient verbalized understanding.

## 2024-05-29 ENCOUNTER — OFFICE VISIT (OUTPATIENT)
Dept: UROLOGY | Facility: CLINIC | Age: 69
End: 2024-05-29
Payer: MEDICARE

## 2024-05-29 VITALS — HEIGHT: 71 IN | RESPIRATION RATE: 12 BRPM | BODY MASS INDEX: 38.03 KG/M2 | WEIGHT: 271.63 LBS

## 2024-05-29 DIAGNOSIS — C67.0 MALIGNANT NEOPLASM OF TRIGONE OF URINARY BLADDER: Primary | ICD-10-CM

## 2024-05-29 LAB
BILIRUB UR QL STRIP: NEGATIVE
GLUCOSE UR QL STRIP: NEGATIVE
KETONES UR QL STRIP: NEGATIVE
LEUKOCYTE ESTERASE UR QL STRIP: NEGATIVE
PH, POC UA: 6.5
POC BLOOD, URINE: NEGATIVE
POC NITRATES, URINE: NEGATIVE
PROT UR QL STRIP: NEGATIVE
SP GR UR STRIP: 1.02 (ref 1–1.03)
UROBILINOGEN UR STRIP-ACNC: 0.2 (ref 0.1–1.1)

## 2024-05-29 PROCEDURE — 99999PBSHW POCT URINALYSIS, DIPSTICK, AUTOMATED, W/O SCOPE: Mod: PBBFAC,,,

## 2024-05-29 PROCEDURE — 81003 URINALYSIS AUTO W/O SCOPE: CPT | Mod: PBBFAC | Performed by: NURSE PRACTITIONER

## 2024-05-29 PROCEDURE — 99999 PR PBB SHADOW E&M-EST. PATIENT-LVL III: CPT | Mod: PBBFAC,,, | Performed by: NURSE PRACTITIONER

## 2024-05-29 PROCEDURE — 99213 OFFICE O/P EST LOW 20 MIN: CPT | Mod: PBBFAC | Performed by: NURSE PRACTITIONER

## 2024-05-29 PROCEDURE — 99214 OFFICE O/P EST MOD 30 MIN: CPT | Mod: S$PBB,,, | Performed by: NURSE PRACTITIONER

## 2024-05-29 PROCEDURE — 99999PBSHW PR PBB SHADOW TECHNICAL ONLY FILED TO HB: Mod: PBBFAC,,,

## 2024-05-29 RX ADMIN — BACILLUS CALMETTE-GUERIN 50 MG: 50 POWDER, FOR SUSPENSION INTRAVESICAL at 10:05

## 2024-05-29 NOTE — PROGRESS NOTES
CC  Bladder cancer     HPI:   Patient is a 68-year-old female that is presenting for BCG treatment.  Urine in clinic is negative.  Patient denies gross hematuria.  04/08/2024  Dr. BIA Lin  Bladder cancer.  - Surveillance cystoscopy today on 4-8-24 is normal.  - History of a TURBT of a small right posterior lateral tumor near the dome and of 2 small adjacent left posterior lateral tumors near the dome and a normal right retrograde pyelogram on 3-24-23.  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  - History of a TURBT of a 3.7 cm left trigone bladder tumor in the location of her left ureteral orifice and left diagnostic ureteroscopy with biopsies of her large left renal pelvis tumor on 5-13-22.  Path of the left trigone bladder tumor:  Invasive high grade papillary urothelial carcinoma, invades superficial muscularis propria.  - Surveillance cystoscopy on 7-25-23 and 11-29-23 are normal.  - Induction BCG from 4-25-23 to 5-30-23.  - First maintenance BCG from 9-6-23 to 9-27-23.  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  - History of a left robotic nephroureterectomy on 6-6-22.  Path:  Invasive high grade papillary urothelial carcinoma, 2 foci, 4.5 cm left renal pelvis and 0.4 cm left distal ureter.  Renal pelvis tumor invades beyond muscularis into the renal parenchyma.  Distal ureteral tumor invades subepithelial connective tissue.  Margins uninvolved.  Lymph-vascular invasion is not identified.  pT3 (m).  - She was treated with chemotherapy due to her left renal pelvis and left distal ureteral urothelial carcinoma from August 2022 to 10- through her Oncologist, Dr Bedoya.  - CKD.  She says that   Saúl is her Nephrologist.  Allergies:  Propofol     Medications:  has a current medication list which includes the following prescription(s): allopurinol, amoxicillin-clavulanate 875-125mg, benzonatate, colchicine, cyclobenzaprine, desloratadine, diazepam, diazepam, diazepam, gabapentin, lisinopril, sulfamethoxazole-trimethoprim 800-160mg, terconazole, and triamcinolone acetonide 0.1%.     Review of Systems:  General: No fever, chills, fatigability, or weight loss.  Skin: No rashes, itching, or changes in color or texture of skin.  Chest: Denies POWER, cyanosis, wheezing, cough, and sputum production.  Abdomen: Appetite fine. No weight loss. Denies diarrhea, abdominal pain, hematemesis, or blood in stool.  Musculoskeletal: No joint stiffness or swelling. Denies back pain.  : As above.  All other review of systems negative.     PMH:   has a past medical history of Acute cystitis without hematuria (2019), Bladder cancer (05/13/2022), Cancer of kidney, Chronic kidney disease, stage 3a (2023), Feeling of incomplete bladder emptying (2019), History of abnormal mammogram, Hypertension, Malignant neoplasm of left ureter (2022), Osteopenia (05/23/2022), Other microscopic hematuria (2022), Personal history of malignant neoplasm of bladder (2022), and Personal history of malignant neoplasm of renal pelvis (2022).     PSH:   has a past surgical history that includes Ankle surgery (Left); Appendectomy; Dilation and curettage of uterus; Hemorrhoid surgery; Total abdominal hysterectomy w/ bilateral salpingoophorectomy (05/30/1997); Tubal ligation; Bladder tumor excision (05/13/2022); Nephrectomy (Left, 06/03/2022); and Vaginal delivery.     FamHx: family history includes Diabetes in her maternal grandmother and mother; Hypertension in her father; Stomach cancer in her maternal grandfather.     SocHx:  reports that she has quit smoking. Her smoking use included cigarettes. She has never used smokeless tobacco. She reports current  alcohol use. She reports that she does not use drugs.       Physical Exam:        Vitals:     05/22/2024   BP: 112/82   Pulse: 78      General: A&Ox3, no apparent distress, no deformities  Neck: No masses, normal thyroid  Lungs: normal inspiration, no use of accessory muscles  Heart: normal pulse, no arrhythmias  Abdomen: Soft, NT, ND, no masses, no hernias, no hepatosplenomegaly  Lymphatic: Neck and groin nodes negative  Labs/Studies:   Urine in clinic was negative     Impression/Plan:   History of bladder cancer  BCG per protocol, see nursing note.   eyeglasses

## 2024-05-29 NOTE — PROGRESS NOTES
......Patient in today for BCG treatment.  #3/3.  One vial of BCG and 50ml perservative free sodium chloride 0.9% mixed as per instructions.  Using aseptic technique, a sterile fenestrated drape was placed over perineum.  Pt was catheterized using a #14Fr red rubber catheter to allow bladder emptying.  Using closed system, one vial of BCG instilled via gravity directly into bladder.  Pt tolerated well.  Instructed pt to keep BCG solution in bladder for 2 hours.  Pt was given instructions to follow for the next 6 hours, including sitting on toilet at home and using 2 cups of undiluted chlorine bleach and closing the lid.  Pt was told to allow bleach to stand for 15 minutes before flushing toilet.  Patient was also told to drink plenty of water to flush any remaining BCG bacteria.  Patient verbalized understanding.

## 2024-08-05 ENCOUNTER — TELEPHONE (OUTPATIENT)
Dept: UROLOGY | Facility: CLINIC | Age: 69
End: 2024-08-05
Payer: MEDICARE

## 2024-08-05 RX ORDER — DIAZEPAM 10 MG/1
10 TABLET ORAL SEE ADMIN INSTRUCTIONS
Qty: 1 TABLET | Refills: 0 | Status: SHIPPED | OUTPATIENT
Start: 2024-08-05 | End: 2024-09-04

## 2024-08-12 ENCOUNTER — PROCEDURE VISIT (OUTPATIENT)
Facility: CLINIC | Age: 69
End: 2024-08-12
Payer: MEDICARE

## 2024-08-12 DIAGNOSIS — Z85.51 PERSONAL HISTORY OF MALIGNANT NEOPLASM OF BLADDER: Primary | ICD-10-CM

## 2024-08-12 PROCEDURE — 52000 CYSTOURETHROSCOPY: CPT | Mod: PBBFAC,PO | Performed by: UROLOGY

## 2024-08-12 PROCEDURE — 88112 CYTOPATH CELL ENHANCE TECH: CPT | Performed by: STUDENT IN AN ORGANIZED HEALTH CARE EDUCATION/TRAINING PROGRAM

## 2024-08-13 NOTE — PROCEDURES
Cystoscopy    Date/Time: 8/12/2024 2:00 PM    Performed by: Nils Lin MD  Authorized by: Nils Lin MD    Consent Done?:  Yes (Verbal) and Yes (Written)  Timeout: prior to procedure the correct patient, procedure, and site was verified    Prep: patient was prepped and draped in usual sterile fashion    Local anesthesia used?: Yes    Anesthesia:  Lidocaine jelly  Indications: history bladder cancer    Position:  Dorsal lithotomy  Anesthesia:  Lidocaine jelly  Preparation: Patient was prepped and draped in usual sterile fashion    Scope type:  Flexible cystoscope   patient tolerated the procedure well with no immediate complications  Comments:      The cystoscope was advanced through the urethra into her bladder.  The bladder was inspected in a systematic fashion.  Her right ureteral orifice is orthotopic and patent with clear efflux of urine.  Her left ureteral orifice is surgically absent.  No bladder tumor, no urothelial lesion, no stone, and no foreign body is seen.  A bladder wash urine cytology was obtained.  The cystoscope was removed from her bladder.  The patient tolerated this procedure well.          7-16-24   CT chest, abdomen, pelvis without IV contrast.  OLOL.  See report.  Left nephrectomy. No evidence of recurrence. LYMPH NODES:  No suspicious adenopathy.  Lungs: Both lungs are clear with no evidence of infiltrate, pleural effusion, pneumothorax or mass. 6 mm discoid nodular density or scarring in the lingula is not significantly changed. No new pulmonary nodules.     1-10-24  CT chest, abdomen, pelvis.  See report.  Small lung nodules that decreased to 5 mm.     10-24-22  CT chest, abdomen, pelvis without IV contrast.  Fabricio.  See report.  No acute abnormality of the chest, abdomen, or pelvis.  Nothing to suggest distant metastasis.  The right kidney is normal in appearance.  The bladder is normal in appearance.  No pulmonary nodules.     I reviewed all of the above imaging results.             7-1-24  Cr 1.47.  GFR 38.4. BUN 31.     4-17-24  Cr 1.48.  GFR 38.  BUN 27.0.     7-25-22  Cr 1.5.  GFR 41.3.     4-8-24  Urine cytology.  Negative.     I reviewed all of the above lab results.            Assessment:  - History of bladder cancer.  - Surveillance cystoscopies on 7-25-23, 11-29-23, 4-8-24, and today on 8-12-24 are normal.  - History of a TURBT of a small right posterior lateral tumor near the dome and of 2 small adjacent left posterior lateral tumors near the dome and a normal right retrograde pyelogram on 3-24-23.  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  - History of a TURBT of a 3.7 cm left trigone bladder tumor in the location of her left ureteral orifice and left diagnostic ureteroscopy with biopsies of her large left renal pelvis tumor on 5-13-22.  Path of the left trigone bladder tumor:  Invasive high grade papillary urothelial carcinoma, invades superficial muscularis propria.  - Induction BCG from 4-25-23 to 5-30-23.  - First maintenance BCG from 9-6-23 to 9-27-23.  2nd round of maintenance BCG from 5-15-24 t 5-29-24.  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  - History of a left robotic nephroureterectomy by me on 6-6-22.  Path:  Invasive high grade papillary urothelial carcinoma, 2 foci, 4.5 cm left renal pelvis and 0.4 cm left distal ureter.  Renal pelvis tumor invades beyond muscularis into the renal parenchyma.  Distal ureteral tumor invades subepithelial connective tissue.  Margins uninvolved.  Lymph-vascular invasion is not identified.  pT3 (m).  - She was treated with chemotherapy due to her left renal pelvis and left distal ureteral urothelial carcinoma from August  2022 to 10- through her Oncologist, Dr Bedoya.  - CKD.  She says that Dr Hays is her Nephrologist.     Plan:  - I explained to the patient today that with the shortage of BCG, that I was told that [facility] is trying to reserve BCG treatments for patients getting induction BCG which she has had and not maintenance BCG.  The patient is interested in continuing on maintenance BCG treatments.  She says her brother in law, Dr David Ferreira, is a Urologist in [place] and she will try to continue with maintenance BCG through Dr David Ferreira.  - Bladder wash urine cytology today.  - I discussed dietary modifications with her today and I recommended she drink mostly water during the day.   - Continue with surveillance imaging with her Oncologist, Dr Bedoya.  - RTC on 12-16-24 for next surveillance cystoscopy.

## 2024-08-14 LAB
FINAL PATHOLOGIC DIAGNOSIS: NORMAL
Lab: NORMAL
MICROSCOPIC EXAM: NORMAL

## 2024-10-16 ENCOUNTER — TELEPHONE (OUTPATIENT)
Dept: UROLOGY | Facility: CLINIC | Age: 69
End: 2024-10-16
Payer: MEDICARE

## 2024-10-16 NOTE — TELEPHONE ENCOUNTER
Message was sent to Dr. Lin         ----- Message from Bruno sent at 10/16/2024  9:18 AM CDT -----  Contact: lisseth Cowart is requesting a call back in regards to an apt discussing her BCG INJECTIONS.   Please give her a call back at [phone]

## 2024-10-17 ENCOUNTER — TELEPHONE (OUTPATIENT)
Dept: UROLOGY | Facility: CLINIC | Age: 69
End: 2024-10-17
Payer: MEDICARE

## 2024-10-17 NOTE — TELEPHONE ENCOUNTER
Called pt and informed her that per Dr. Lin, a referral has been sent to Dr. Silver at [facility] for pt to receive 3 maintenance BCG treatments.  Pt verbalized understanding.

## 2024-10-22 ENCOUNTER — TELEPHONE (OUTPATIENT)
Dept: UROLOGY | Facility: CLINIC | Age: 69
End: 2024-10-22
Payer: MEDICARE

## 2024-10-22 RX ORDER — DIAZEPAM 10 MG/1
10 TABLET ORAL SEE ADMIN INSTRUCTIONS
Qty: 4 TABLET | Refills: 0 | Status: SHIPPED | OUTPATIENT
Start: 2024-10-22 | End: 2024-11-21

## 2024-10-22 NOTE — TELEPHONE ENCOUNTER
Patient is requesting a total of 4 Diazepam 10mg tablets before BCG injections and CYSTO procedure. Please advise    ----- Message from Sofia sent at 10/22/2024 11:24 AM CDT -----  Contact: Patient, [phone]  Calling to speak with the nurse regarding the procedure. Please call her. Thanks.

## 2024-10-22 NOTE — TELEPHONE ENCOUNTER
Outgoing call placed to patient to confirm CYSTO procedure date and time. Patient has been rescheduled to December due to BCG being scheduled Nov. 6th, 13th, and 20th with Dr. Silver. Patient is requesting Valium before procedure. Message has been sent to provider.      ----- Message from Sofia sent at 10/22/2024 11:24 AM CDT -----  Contact: Patient, [phone]  Calling to speak with the nurse regarding the procedure. Please call her. Thanks.

## 2024-11-25 ENCOUNTER — TELEPHONE (OUTPATIENT)
Dept: UROLOGY | Facility: CLINIC | Age: 69
End: 2024-11-25
Payer: MEDICARE

## 2024-11-25 NOTE — TELEPHONE ENCOUNTER
Patient called regarding reoccurring UTI's. Patient reports that last month she had a UTI then it got cleared so she could take her 3 BCG treatments in November by Dr. Silver. She reports that the office has found another UTI and she is currently taking another medication, patient wanted to know why she keeps experiencing UTI's. She reports no symptoms of a UTI.  Patient is requesting a call back from the provider.

## 2024-11-26 ENCOUNTER — TELEPHONE (OUTPATIENT)
Dept: UROLOGY | Facility: CLINIC | Age: 69
End: 2024-11-26
Payer: MEDICARE

## 2024-11-26 NOTE — TELEPHONE ENCOUNTER
I called pt back regarding her concern about having recurrent UTI's after her BCG treatments.  Detailed message left on her vm, including offering her an appt on tomorrow to see MD.

## 2024-12-16 ENCOUNTER — PROCEDURE VISIT (OUTPATIENT)
Facility: CLINIC | Age: 69
End: 2024-12-16
Payer: MEDICARE

## 2024-12-16 DIAGNOSIS — Z85.51 PERSONAL HISTORY OF MALIGNANT NEOPLASM OF BLADDER: Primary | ICD-10-CM

## 2024-12-16 PROCEDURE — 99999PBSHW POCT URINALYSIS, DIPSTICK, AUTOMATED, W/O SCOPE: Mod: PBBFAC,,,

## 2024-12-16 PROCEDURE — 88112 CYTOPATH CELL ENHANCE TECH: CPT | Performed by: PATHOLOGY

## 2024-12-16 PROCEDURE — 52000 CYSTOURETHROSCOPY: CPT | Mod: PBBFAC,PO | Performed by: UROLOGY

## 2024-12-16 PROCEDURE — 81003 URINALYSIS AUTO W/O SCOPE: CPT | Mod: PBBFAC,PO | Performed by: UROLOGY

## 2024-12-16 NOTE — PROCEDURES
Cystoscopy    Date/Time: 12/16/2024 10:30 AM    Performed by: Nils Lin MD  Authorized by: Nils Lin MD    Consent Done?:  Yes (Verbal) and Yes (Written)  Timeout: prior to procedure the correct patient, procedure, and site was verified    Prep: patient was prepped and draped in usual sterile fashion    Local anesthesia used?: Yes    Anesthesia:  Lidocaine jelly  Indications: history bladder cancer    Position:  Dorsal lithotomy  Anesthesia:  Lidocaine jelly  Preparation: Patient was prepped and draped in usual sterile fashion    Scope type:  Flexible cystoscope   patient tolerated the procedure well with no immediate complications  Comments:      The cystoscope was advanced through the urethra into her bladder.  The bladder was inspected in a systematic fashion.  Her right ureteral orifice is orthotopic and patent with clear efflux of urine.  Her left ureteral orifice is surgically absent.  No bladder tumor, no urothelial lesion, no stone, and no foreign body is seen.  A bladder wash urine cytology was obtained.  The cystoscope was removed from her bladder.  The patient tolerated the cystoscopy well.             7-16-24   CT chest, abdomen, pelvis without IV contrast.  OLOL.  See report.  Left nephrectomy. No evidence of recurrence. LYMPH NODES:  No suspicious adenopathy.  Lungs: Both lungs are clear with no evidence of infiltrate, pleural effusion, pneumothorax or mass. 6 mm discoid nodular density or scarring in the lingula is not significantly changed. No new pulmonary nodules.      1-10-24  CT chest, abdomen, pelvis.  See report.  Small lung nodules that decreased to 5 mm.     10-24-22  CT chest, abdomen, pelvis without IV contrast.  Fabricio.  See report.  No acute abnormality of the chest, abdomen, or pelvis.  Nothing to suggest distant metastasis.  The right kidney is normal in appearance.  The bladder is normal in appearance.  No pulmonary nodules.     I reviewed all of the above imaging results.             7-1-24  Cr 1.47.  GFR 38.4. BUN 31.     4-17-24  Cr 1.48.  GFR 38.  BUN 27.0.     7-25-22  Cr 1.5.  GFR 41.3.     4-8-24  Urine cytology.  Negative.     I reviewed all of the above lab results.            Assessment:  - History of bladder cancer.  - Surveillance cystoscopies on 7-25-23, 11-29-23, 4-8-24, 8-12-24, and today on 12-16-24 are all normal.  - History of a TURBT of a small right posterior lateral tumor near the dome and of 2 small adjacent left posterior lateral tumors near the dome and a normal right retrograde pyelogram on 3-24-23.  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  - History of a TURBT of a 3.7 cm left trigone bladder tumor in the location of her left ureteral orifice and left diagnostic ureteroscopy with biopsies of her large left renal pelvis tumor on 5-13-22.  Path of the left trigone bladder tumor:  Invasive high grade papillary urothelial carcinoma, invades superficial muscularis propria.  - Induction BCG from 4-25-23 to 5-30-23.  - First maintenance BCG from 9-6-23 to 9-27-23.  2nd round of maintenance BCG from 5-15-24 to 5-29-24.  3rd round of maintenance BCG in November 2024 through Dr Silver.  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  - History of a left robotic nephroureterectomy by me on 6-6-22.  Path:  Invasive high grade papillary urothelial carcinoma, 2 foci, 4.5 cm left renal pelvis and 0.4 cm left distal ureter.  Renal pelvis tumor invades beyond muscularis into the renal parenchyma.  Distal ureteral tumor invades subepithelial connective tissue.  Margins uninvolved.  Lymph-vascular invasion is not identified.  pT3 (m).  - She was treated with chemotherapy  due to her left renal pelvis and left distal ureteral urothelial carcinoma from August 2022 to 10- through her Oncologist, Dr Bedoya.  - CKD.  Dr Hays is her Nephrologist.     Plan:  - Bladder wash urine cytology today.  - I discussed dietary modifications with her today and I recommended she drink mostly water during the day.   - Continue with surveillance imaging with her Oncologist, Dr Bedoya.  - She plans to do her 4th round of maintenance BCG treatments through Dr Silver in May 2025.  - RTC in 4 months for her next surveillance cystoscopy.

## 2024-12-18 LAB
FINAL PATHOLOGIC DIAGNOSIS: ABNORMAL
Lab: ABNORMAL

## 2024-12-19 ENCOUNTER — TELEPHONE (OUTPATIENT)
Dept: UROLOGY | Facility: CLINIC | Age: 69
End: 2024-12-19
Payer: MEDICARE

## 2024-12-19 DIAGNOSIS — R82.89 ABNORMAL URINE CYTOLOGY: Primary | ICD-10-CM

## 2024-12-19 NOTE — TELEPHONE ENCOUNTER
Patient was contacted with urine cytology results; informed her that Dr. Lin wanted to do a Urovysion urine test; order in [facility]; pt will stop by clinic on tomorrow to obtain the kit for the test.

## 2025-01-03 ENCOUNTER — TELEPHONE (OUTPATIENT)
Dept: UROLOGY | Facility: CLINIC | Age: 70
End: 2025-01-03
Payer: MEDICARE

## 2025-01-03 NOTE — TELEPHONE ENCOUNTER
Called back and patient and she asked why  wanted these tests ordered and what atypical urothelial cells were. I let her know that I would send  a message. Patient kept asking why I could not tell her I just stated that those are things that have to be discussed with the physician.         ----- Message from PLTechduke sent at 1/3/2025 10:26 AM CST -----  Contact: [phone]  Type:  Patient Returning Call    Who Called:ROSALES CLARK [[id number]]  Who Left Message for Patient:  Does the patient know what this is regarding?:need to talk to the nurse  Would the patient rather a call back or a response via [facility]? Call back  Best Call Back Number:[phone]  Additional Information: n [phone]

## 2025-01-06 ENCOUNTER — TELEPHONE (OUTPATIENT)
Dept: UROLOGY | Facility: CLINIC | Age: 70
End: 2025-01-06
Payer: MEDICARE

## 2025-01-06 NOTE — TELEPHONE ENCOUNTER
I called client services at [facility] to find out about the Urovysion test pt had done; was told that it would take a few more days as it is still being processed.

## 2025-01-08 ENCOUNTER — TELEPHONE (OUTPATIENT)
Dept: UROLOGY | Facility: CLINIC | Age: 70
End: 2025-01-08
Payer: MEDICARE

## 2025-01-08 NOTE — TELEPHONE ENCOUNTER
Called patient and informed her of results. Patient voiced understanding.       ----- Message from Nils Lin MD sent at 1/8/2025  1:48 PM CST -----  Please call Ms Padgett and let her know that I reviewed her urine urovysion test result and it did not show any evidence of urothelial carcinoma which is great.  Make sure she knows the details of her upcoming cystoscopy.  Ask her if she has any questions.

## 2025-01-09 ENCOUNTER — TELEPHONE (OUTPATIENT)
Dept: UROLOGY | Facility: CLINIC | Age: 70
End: 2025-01-09
Payer: MEDICARE

## 2025-01-09 ENCOUNTER — PATIENT MESSAGE (OUTPATIENT)
Dept: UROLOGY | Facility: CLINIC | Age: 70
End: 2025-01-09
Payer: MEDICARE

## 2025-01-09 NOTE — TELEPHONE ENCOUNTER
Attempted to contact pt; no answer.  Test results sent to her via [facility].  Nurse Morton  ----- Message from Nils Lin MD sent at 1/8/2025  1:48 PM CST -----  Please call Ms Padgett and let her know that I reviewed her urine urovysion test result and it did not show any evidence of urothelial carcinoma which is great.  Make sure she knows the details of her upcoming cystoscopy.  Ask her if she has any questions.

## 2025-04-14 ENCOUNTER — TELEPHONE (OUTPATIENT)
Dept: UROLOGY | Facility: CLINIC | Age: 70
End: 2025-04-14
Payer: MEDICARE

## 2025-04-17 DIAGNOSIS — F41.9 ANXIETY: Primary | ICD-10-CM

## 2025-04-17 RX ORDER — DIAZEPAM 10 MG/1
10 TABLET ORAL SEE ADMIN INSTRUCTIONS
Qty: 4 TABLET | Refills: 0 | Status: SHIPPED | OUTPATIENT
Start: 2025-04-17 | End: 2025-05-17

## 2025-04-21 ENCOUNTER — PROCEDURE VISIT (OUTPATIENT)
Facility: CLINIC | Age: 70
End: 2025-04-21
Payer: MEDICARE

## 2025-04-21 DIAGNOSIS — Z85.51 PERSONAL HISTORY OF MALIGNANT NEOPLASM OF BLADDER: Primary | ICD-10-CM

## 2025-04-21 LAB
BILIRUB SERPL-MCNC: NORMAL MG/DL
BLOOD URINE, POC: NORMAL
COLOR, POC UA: YELLOW
GLUCOSE UR QL STRIP: NORMAL
KETONES UR QL STRIP: NORMAL
LEUKOCYTE ESTERASE URINE, POC: NORMAL
NITRITE, POC UA: NORMAL
PH, POC UA: 6
PROTEIN, POC: NORMAL
SPECIFIC GRAVITY, POC UA: 1.02
UROBILINOGEN, POC UA: 0.2

## 2025-04-21 PROCEDURE — 88112 CYTOPATH CELL ENHANCE TECH: CPT | Mod: TC | Performed by: UROLOGY

## 2025-04-21 PROCEDURE — 99999PBSHW POCT URINALYSIS, DIPSTICK OR TABLET REAGENT, AUTOMATED, WITH MICROSCOP: Mod: PBBFAC,,,

## 2025-04-21 PROCEDURE — 81001 URINALYSIS AUTO W/SCOPE: CPT | Mod: PBBFAC,PO | Performed by: UROLOGY

## 2025-04-21 PROCEDURE — 52000 CYSTOURETHROSCOPY: CPT | Mod: PBBFAC,PO | Performed by: UROLOGY

## 2025-04-21 NOTE — PROCEDURES
Cystoscopy    Date/Time: 4/21/2025 10:30 AM    Performed by: Nils Lin MD  Authorized by: Nils Lin MD    Consent Done?:  Yes (Verbal) and Yes (Written)  Timeout: prior to procedure the correct patient, procedure, and site was verified    Prep: patient was prepped and draped in usual sterile fashion    Anesthesia:  Lidocaine jelly  Indications: history bladder cancer    Position:  Dorsal lithotomy  Anesthesia:  Lidocaine jelly  Preparation: Patient was prepped and draped in usual sterile fashion    Scope type:  Flexible cystoscope   patient tolerated the procedure well with no immediate complications  Comments:      The cystoscope was advanced through the urethra into her bladder.  The bladder was inspected in a systematic fashion.  Her right ureteral orifice is orthotopic and patent with clear efflux of urine.  Her left ureteral orifice is surgically absent.  No bladder tumor, no urothelial lesion, no stone, and no foreign body is seen.  A bladder wash urine cytology was obtained.  The cystoscope was removed from her bladder.  The patient tolerated this procedure well.        1-14-25  CT chest, abdomen, pelvis without IV contrast.  OLOL.  See report.  Previous left nephrectomy. No evidence of recurrence. No significant change in the appearance of the scan.  Both lungs are clear with no evidence of pleural effusion, pneumothorax,   mass or significant infiltrate.     7-16-24   CT chest, abdomen, pelvis without IV contrast.  OLOL.  See report.  Left nephrectomy. No evidence of recurrence. LYMPH NODES:  No suspicious adenopathy.  Lungs: Both lungs are clear with no evidence of infiltrate, pleural effusion, pneumothorax or mass. 6 mm discoid nodular density or scarring in the lingula is not significantly changed. No new pulmonary nodules.      1-10-24  CT chest, abdomen, pelvis.  See report.  Small lung nodules that decreased to 5 mm.     10-24-22  CT chest, abdomen, pelvis without IV contrast.  Fabricio.   See report.  No acute abnormality of the chest, abdomen, or pelvis.  Nothing to suggest distant metastasis.  The right kidney is normal in appearance.  The bladder is normal in appearance.  No pulmonary nodules.     I reviewed all of the above imaging results.           1-14-25  Cr 1.58.  GFR 35.  BUN 24.     7-1-24  Cr 1.47.  GFR 38.4. BUN 31.     4-17-24  Cr 1.48.  GFR 38.  BUN 27.0.     7-25-22  Cr 1.5.  GFR 41.3.     4-8-24  Urine cytology.  Negative.     I reviewed all of the above lab results.            Assessment:  - History of bladder cancer.  - Surveillance cystoscopies on 7-25-23, 11-29-23, 4-8-24, 8-12-24, 12-16-24, and today on 4-21-25 are all normal.  - History of a TURBT of a small right posterior lateral tumor near the dome and of 2 small adjacent left posterior lateral tumors near the dome and a normal right retrograde pyelogram on 3-24-23.  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  - History of a TURBT of a 3.7 cm left trigone bladder tumor in the location of her left ureteral orifice and left diagnostic ureteroscopy with biopsies of her large left renal pelvis tumor on 5-13-22.  Path of the left trigone bladder tumor:  Invasive high grade papillary urothelial carcinoma, invades superficial muscularis propria.  - Induction BCG from 4-25-23 to 5-30-23.  - First maintenance BCG from 9-6-23 to 9-27-23.  2nd round of maintenance BCG from 5-15-24 to 5-29-24.  3rd round of maintenance BCG in November 2024 through Dr Silver.  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  - History of a left robotic nephroureterectomy by me on 6-6-22.  Path:  Invasive high grade papillary urothelial carcinoma, 2  foci, 4.5 cm left renal pelvis and 0.4 cm left distal ureter.  Renal pelvis tumor invades beyond muscularis into the renal parenchyma.  Distal ureteral tumor invades subepithelial connective tissue.  Margins uninvolved.  Lymph-vascular invasion is not identified.  pT3 (m).  - She was treated with chemotherapy due to her left renal pelvis and left distal ureteral urothelial carcinoma from August 2022 to 10- through her Oncologist, Dr Bedoya.  - CKD.  Dr Hays is her Nephrologist.     Plan:  - Bladder wash urine cytology today.  - I discussed dietary modifications with her today and I recommended she drink mostly water during the day.   - Continue with surveillance imaging with her Oncologist, Dr Bedoya.  - She plans to do her 4th round of maintenance BCG treatments through Dr Silver in May 2025.  - RTC in 6 months for her next surveillance cystoscopy.

## 2025-04-22 LAB
ESTROGEN SERPL-MCNC: NORMAL PG/ML
INSULIN SERPL-ACNC: NORMAL U[IU]/ML
LAB AP GROSS DESCRIPTION: NORMAL
LAB AP PERFORMING LOCATION(S): NORMAL
LAB AP URINE CYTOLOGY INTERPRETATION SPECIMEN 1: NORMAL

## 2025-04-23 ENCOUNTER — RESULTS FOLLOW-UP (OUTPATIENT)
Dept: UROLOGY | Facility: CLINIC | Age: 70
End: 2025-04-23